# Patient Record
Sex: MALE | Race: WHITE | NOT HISPANIC OR LATINO | Employment: OTHER | URBAN - METROPOLITAN AREA
[De-identification: names, ages, dates, MRNs, and addresses within clinical notes are randomized per-mention and may not be internally consistent; named-entity substitution may affect disease eponyms.]

---

## 2018-06-12 ENCOUNTER — TRANSCRIBE ORDERS (OUTPATIENT)
Dept: ADMINISTRATIVE | Facility: HOSPITAL | Age: 78
End: 2018-06-12

## 2018-06-12 DIAGNOSIS — R22.1 NECK MASS: Primary | ICD-10-CM

## 2018-06-27 ENCOUNTER — HOSPITAL ENCOUNTER (OUTPATIENT)
Dept: CT IMAGING | Facility: HOSPITAL | Age: 78
Discharge: HOME | End: 2018-06-27
Attending: INTERNAL MEDICINE | Admitting: INTERNAL MEDICINE

## 2018-06-27 DIAGNOSIS — R22.1 NECK MASS: ICD-10-CM

## 2018-06-27 PROCEDURE — 70491 CT SOFT TISSUE NECK W/DYE: CPT

## 2018-06-27 PROCEDURE — 25010000002 IOPAMIDOL 61 % SOLUTION: Performed by: INTERNAL MEDICINE

## 2018-06-27 RX ADMIN — IOPAMIDOL 100 ML: 612 INJECTION, SOLUTION INTRAVENOUS at 15:00

## 2018-10-16 ENCOUNTER — TRANSCRIBE ORDERS (OUTPATIENT)
Dept: ADMINISTRATIVE | Facility: HOSPITAL | Age: 78
End: 2018-10-16

## 2018-10-16 DIAGNOSIS — R13.10 DYSPHAGIA, UNSPECIFIED TYPE: Primary | ICD-10-CM

## 2018-10-24 ENCOUNTER — APPOINTMENT (OUTPATIENT)
Dept: GENERAL RADIOLOGY | Facility: HOSPITAL | Age: 78
End: 2018-10-24
Attending: INTERNAL MEDICINE

## 2018-10-25 ENCOUNTER — HOSPITAL ENCOUNTER (OUTPATIENT)
Dept: GENERAL RADIOLOGY | Facility: HOSPITAL | Age: 78
Discharge: HOME | End: 2018-10-25
Attending: INTERNAL MEDICINE | Admitting: INTERNAL MEDICINE

## 2018-10-25 DIAGNOSIS — R13.10 DYSPHAGIA, UNSPECIFIED TYPE: ICD-10-CM

## 2018-10-25 PROCEDURE — G8997 SWALLOW GOAL STATUS: HCPCS

## 2018-10-25 PROCEDURE — G8996 SWALLOW CURRENT STATUS: HCPCS

## 2018-10-25 PROCEDURE — 92611 MOTION FLUOROSCOPY/SWALLOW: CPT

## 2018-10-25 PROCEDURE — 74230 X-RAY XM SWLNG FUNCJ C+: CPT

## 2018-10-25 PROCEDURE — G8998 SWALLOW D/C STATUS: HCPCS

## 2018-10-25 NOTE — MBS/VFSS/FEES
Outpatient Speech Language Pathology   Adult Swallow Modified Barium Swallow Study  [facility]vas     Patient Name: Dwight Apple  : 1940  MRN: [id number]  Today's Date: 10/25/2018         Visit Date: 10/25/2018   There is no problem list on file for this patient.       No past medical history on file.     No past surgical history on file.      Visit Dx:     ICD-10-CM ICD-9-CM   1. Dysphagia, unspecified type R13.10 787.20                 SLP Adult Swallow Evaluation - 10/25/18 0800        Rehab Evaluation    Document Type evaluation  -AD    Subjective Information no complaints  -AD    Patient Observations alert;cooperative;agree to therapy  -AD    Patient/Family Observations Pt seen for MBS while his wife remained in the waiting room. He was seen standing during the procedure and was alert and cooperative.   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 Wife reports he was diagnosed in 2018 and received 14 radiation and 2 chemotherapy treatments. He states his kidneys started to shut down and he was no longer able to receive any further treatments. 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 He reports a feeding tube was placed at onset of his diagnosis and he has been using for nutrition but taking medications by mouth. He is on continuous feeds. He has been receiving dysphagia therapy and a modified barium swallow was recommended. 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 No recent respiratory concerns reported.   -AD    Current Method of Nutrition gastrostomy feedings;other (see comments)   medications with water by mouth  -AD    Precautions/Limitations, Vision WFL;for purposes of eval  -AD    Precautions/Limitations, Hearing WFL;for purposes of eval  -AD    Prior  Level of Function-Communication WFL  -AD    Prior Level of Function-Swallowing no diet consistency restrictions;safe, efficient swallowing in all situations;regular textures;thin liquids   prior to cancer diagnosis  -AD    Plans/Goals Discussed with patient;spouse/S.O.;agreed upon   spouse with patient's permission  -AD    Barriers to Rehab none identified  -AD    Patient's Goals for Discharge return to PO diet  -AD    Family Goals for Discharge patient able to return to PO diet  -AD       Pain Assessment    Additional Documentation --   no pain reported at this time.  -AD       Oral Motor and Function    Oral Lesions or Structural Abnormalities and/or variants brownish coating to the 1/3 posterior surface of the tongue  -AD    Dentition Assessment edentulous, does not have dentures;other (see comments)   pulled prior to treatment  -AD    Secretion Management WNL/WFL  -AD    Mucosal Quality moist, healthy  -AD    Volitional Swallow WFL  -AD    Volitional Cough WFL  -AD       Oral Musculature and Cranial Nerve Assessment    Oral Motor General Assessment lingual impairment  -AD    Lingual Impairment, Detail. Cranial Nerves IX, XII (Glossopharyngeal and Hypoglossal) reduced lingual ROM;other (see comments)   decreased elevation  -AD       General Eating/Swallowing Observations    Respiratory Support Currently in Use room air  -AD    Eating/Swallowing Skills fed by SLP;self-fed;appropriate self-feeding skills observed   cup self fed  -AD    Positioning During Eating other (see comments)   standing  -AD       Respiratory    Respiratory Status WFL;room air;during swallowing/eating  -AD       MBS/VFSS    Utensils Used spoon;cup  -AD    Consistencies Trialed soft textures;pureed;thin liquids;nectar/syrup-thick liquids;honey-thick liquids  -AD       MBS/VFSS Interpretation    Oral Prep Phase WFL  -AD    Oral Transit Phase impaired  -AD    Oral Residue WFL  -AD    VFSS Summary Pt presents with a mild to moderate oropharyngeal  dysphagia with decreased oral control of thins resulting in premature spill and silent deep penetration. Pt also with some pharyngeal residue due to decreased tongue base and also felt to be due to decreased anterior hyolaryngeal movement. No aspiration was viewed. Pt was able to peform a chin tuck that was effective on 1/2 spoon size trials of thins with decreased penetration to a moderate depth. Pt with decreased sensory response to material with no cough or throat clear elicited with deep penetration on 3/4 trials of thins. See details below.   -AD    Oral Phase, Comment Grossly WFL with the exception of premature spill of thin liquids.   -AD       Oral Transit Phase    Impaired Oral Transit Phase premature spillage of liquids into pharynx  -AD    Premature Spillage of Liquids into Pharynx thin liquids;secondary to reduced lingual control;other (see comments)   decreased back of tongue control.  -AD       Initiation of Pharyngeal Swallow    Initiation of Pharyngeal Swallow bolus in valleculae;other (see comments)   thins only  -AD    Pharyngeal Phase impaired pharyngeal phase of swallowing  -AD    Penetration Before the Swallow thin liquids;secondary to reduced back of tongue control  -AD    Penetration During the Swallow thin liquids;secondary to delayed swallow initiation or mistiming;secondary to reduced vestibular closure  -AD    Response to Penetration deep;no response;other (see comments)   most material ejected, but not all. Mild coating.   -AD    Rosenbek's Scale nectar:;honey:;pudding/puree:;1--->level 1;thin:  -AD    Pharyngeal Residue nectar-thick liquids;honey-thick liquids;pudding/puree;regular textures;valleculae;pyriform sinuses;secondary to reduced base of tongue retraction;secondary to reduced hyolaryngeal excursion   trace pyriforms except w/thins; greater vallecular residue  -AD    Response to Residue cleared residue with cued swallow;other (see comments)   from the valleculae  -AD    Attempted  Compensatory Maneuvers bolus size;bolus presentation style;chin tuck;additional subsequent swallow  -AD    Response to Attempted Compensatory Maneuvers reduced residue;did not prevent penetration;other (see comments)   reduced the depth of pen on thins on 1/2 trials  -AD    Pharyngeal Phase, Comment Pt with noted decreased tongue base retraction resulting in some retention of material in the valleculae. Able to clear with a cued subsequent swallow. Mildly decreased anterior hyolaryngeal movement. Pt with premature spill of thins due to decreased back of tongue control. Pt also with penetration of thins prior to the swallow due to delay. Pt able to close airway at the level of the vocal cords and partially expell some of the penetrated material. No direct aspiration was viewed. Most of the material was cleared from the vestibule but a slight coating was noted. No cough or throat clear was elicited. Pt with decreased depth of penetration to a moderate level on 1/2 trials of thins by spoon using a chin tuck. Second trial of thins by chin tuck resulted in penetration to the cords before the swallow due to spilled material.   -AD       Clinical Impression    SLP Swallowing Diagnosis mild-moderate;oral dysfunction;pharyngeal dysfunction  -AD    Functional Impact risk of aspiration/pneumonia  -AD    Rehab Potential/Prognosis, Swallowing good, to achieve stated therapy goals   for continued home health  -AD    Swallow Criteria for Skilled Therapeutic Interventions Met demonstrates skilled criteria  -AD       Recommendations    Therapy Frequency (Swallow) evaluation only   tx per home health  -AD    SLP Diet Recommendation soft textures;ground;mechanical soft with no mixed consistencies;nectar thick liquids;water between meals after oral care, with supervision;other (see comments)   supplemental tube feedings until adequate nutrition by mouth  -AD    Recommended Diagnostics reassess via clinical swallow evaluation   for  advancement to thin liquids  -AD    Recommended Precautions and Strategies upright posture during/after eating;small bites of food and sips of liquid;multiple swallows per bite of food;chin Tuck;other (see comments)   increased airway closure during the swallow on thins  -AD    SLP Rec. for Method of Medication Administration with thick liquids;meds whole  -AD    Monitor for Signs of Aspiration no;none - silent aspiration present   silent deep penetration  -AD    Anticipated Dischage Disposition home with home health  -AD      User Key  (r) = Recorded By, (t) = Taken By, (c) = Cosigned By    Initials Name Provider Type    Evie Smith MS CCC-SLP Speech Therapist              OP SLP Education     Row Name 10/25/18 0800       Education    Barriers to Learning No barriers identified  -AD    Education Provided Described results of evaluation;Patient expressed understanding of evaluation;Family/caregivers expressed understanding of evaluation;Patient demonstrated recommended strategies;Family/caregivers demonstrated recommended strategies;Patient requires further education on strategies, risks  -AD    Assessed Learning needs;Learning motivation;Learning preferences;Learning readiness  -AD    Learning Motivation Strong  -AD    Learning Method Explanation;Demonstration  -AD    Teaching Response Verbalized understanding;Demonstrated understanding  -AD      User Key  (r) = Recorded By, (t) = Taken By, (c) = Cosigned By    Initials Name Effective Dates    Evie Smith MS CCC-SLP 06/22/16 -               OP SLP Assessment/Plan - 10/25/18 0800        SLP Assessment    Functional Problems Swallowing  -AD    Impact on Function: Swallowing Risk of malnourishment;Risk of dehydration;Risk of aspiration;Risk of pneumonia  -AD    Clinical Impression: Swallowing Mild-Moderate:;oropharyngeal phase dysphagia  -AD    Functional Problems Comment Pt currently not taking po by mouth. Nutrition is provided via G-tube  -AD     SLP Diagnosis Mild to moderate oropharyngeal dysphagia   -AD    Prognosis Good (comment)   Tolerance of modified diet, motivation  -AD    Patient would benefit from skilled therapy intervention Yes  -AD       SLP Plan    Frequency Evaluation only, therapy per Home health SLP  -AD    Planned CPT's? SLP MBS: [id number]  -CHELY      User Key  (r) = Recorded By, (t) = Taken By, (c) = Cosigned By    Initials Name Provider Type    AD Evie Long, MS CCC-SLP Speech Therapist          Time Calculation:   SLP Start Time: 0800  SLP Stop Time: 0859  SLP Time Calculation (min): 59 min  SLP Non-Billable Time (min): 0 min  Total Timed Code Minutes- SLP: 0 minute(s)    Therapy Charges for Today     Code Description Service Date Service Provider Modifiers Qty    46307758688 HC ST SWALLOWING CURRENT STATUS 10/25/2018 Evie Long MS CCC-SLP GN, CK 1    65419271591 HC ST SWALLOWING PROJECTED 10/25/2018 Evie Long MS CCC-SLP GN, CK 1    [id number] HC ST SWALLOWING DISCHARGE 10/25/2018 Evie Long MS CCC-SLP GN, CK 1    [id number] HC ST MOTION FLUORO EVAL SWALLOW 4 10/25/2018 Evie Long MS CCC-SLP GN 1          SLP G-Codes  SLP NOMS Used?: Yes  Functional Limitations: Swallowing  Swallow Current Status (): At least 40 percent but less than 60 percent impaired, limited or restricted  Swallow Goal Status (): At least 40 percent but less than 60 percent impaired, limited or restricted  Swallow Discharge Status (): At least 40 percent but less than 60 percent impaired, limited or restricted        Evie Long MS CCC-SLP  10/25/2018